# Patient Record
Sex: MALE | Race: WHITE | NOT HISPANIC OR LATINO | Employment: OTHER | URBAN - NONMETROPOLITAN AREA
[De-identification: names, ages, dates, MRNs, and addresses within clinical notes are randomized per-mention and may not be internally consistent; named-entity substitution may affect disease eponyms.]

---

## 2019-04-18 ENCOUNTER — HISTORICAL (OUTPATIENT)
Dept: ADMINISTRATIVE | Facility: HOSPITAL | Age: 79
End: 2019-04-18

## 2019-05-01 ENCOUNTER — HISTORICAL (OUTPATIENT)
Dept: ADMINISTRATIVE | Facility: HOSPITAL | Age: 79
End: 2019-05-01

## 2019-06-11 ENCOUNTER — HISTORICAL (OUTPATIENT)
Dept: ADMINISTRATIVE | Facility: HOSPITAL | Age: 79
End: 2019-06-11

## 2020-01-08 ENCOUNTER — OFFICE VISIT (OUTPATIENT)
Dept: UROLOGY | Facility: CLINIC | Age: 80
End: 2020-01-08
Payer: MEDICARE

## 2020-01-08 DIAGNOSIS — N13.8 BPH WITH URINARY OBSTRUCTION: Primary | ICD-10-CM

## 2020-01-08 DIAGNOSIS — R35.1 NOCTURIA: ICD-10-CM

## 2020-01-08 DIAGNOSIS — R39.15 URINARY URGENCY: ICD-10-CM

## 2020-01-08 DIAGNOSIS — R97.20 ELEVATED PSA: ICD-10-CM

## 2020-01-08 DIAGNOSIS — N40.1 BPH WITH URINARY OBSTRUCTION: Primary | ICD-10-CM

## 2020-01-08 DIAGNOSIS — R35.0 URINARY FREQUENCY: ICD-10-CM

## 2020-01-08 LAB — PSA, DIAGNOSTIC: 4.45 NG/ML (ref 0.1–4)

## 2020-01-08 PROCEDURE — 1159F MED LIST DOCD IN RCRD: CPT | Mod: S$GLB,,, | Performed by: UROLOGY

## 2020-01-08 PROCEDURE — 51798 PR MEAS,POST-VOID RES,US,NON-IMAGING: ICD-10-PCS | Mod: S$GLB,,, | Performed by: UROLOGY

## 2020-01-08 PROCEDURE — 1159F PR MEDICATION LIST DOCUMENTED IN MEDICAL RECORD: ICD-10-PCS | Mod: S$GLB,,, | Performed by: UROLOGY

## 2020-01-08 PROCEDURE — 99214 PR OFFICE/OUTPT VISIT, EST, LEVL IV, 30-39 MIN: ICD-10-PCS | Mod: 25,S$GLB,, | Performed by: UROLOGY

## 2020-01-08 PROCEDURE — 51798 US URINE CAPACITY MEASURE: CPT | Mod: S$GLB,,, | Performed by: UROLOGY

## 2020-01-08 PROCEDURE — 99214 OFFICE O/P EST MOD 30 MIN: CPT | Mod: 25,S$GLB,, | Performed by: UROLOGY

## 2020-01-08 RX ORDER — ALFUZOSIN HYDROCHLORIDE 10 MG/1
10 TABLET, EXTENDED RELEASE ORAL
Qty: 30 TABLET | Refills: 1 | Status: SHIPPED | OUTPATIENT
Start: 2020-01-08 | End: 2020-02-20

## 2020-01-08 NOTE — PROGRESS NOTES
Subjective:       Patient ID: Jair Pantoja is a 79 y.o. male.    Chief Complaint: No chief complaint on file.      HPI: 79-year-old male, patient of Dr. Bernal, presents for re-evaluation.  Patient last seen 01/23/2019.  Patient has a history of BPH, was on Flomax in the past.  However patient stop the Flomax because of side effects.  Patient states he has been having an increase in urinary frequency with episodes of urgency.  Also states he is having multiple episodes of nocturia at night.  Denies pain or burning with urination.  Denies odor to his urine.  Denies fever.  Patient has a history of an elevated PSA.  He had a biopsy on 07/27/2010, his PSA at that time was 3.89, biopsy was benign.  On 01/24/2019 he had a PSA of 4.13.  And on 11/20/2018 his PSA was 5.63, which was treated with Cipro.  Patient states he had a fall recently, was seen by a ear nose and throat doctor and is scheduled for a procedure in the near future.  No other urinary complaints.  All other health problems appear stable at this time.       Past Medical History:   Past Medical History:   Diagnosis Date    Diabetes     Heart disease     MI (myocardial infarction)     Stroke        Past Surgical Historical:   Past Surgical History:   Procedure Laterality Date    pace maker          Medications:      Past Social History:   Social History     Socioeconomic History    Marital status:      Spouse name: Not on file    Number of children: Not on file    Years of education: Not on file    Highest education level: Not on file   Occupational History    Not on file   Social Needs    Financial resource strain: Not on file    Food insecurity:     Worry: Not on file     Inability: Not on file    Transportation needs:     Medical: Not on file     Non-medical: Not on file   Tobacco Use    Smoking status: Never Smoker   Substance and Sexual Activity    Alcohol use: Not Currently    Drug use: Not on file    Sexual activity: Not on  file   Lifestyle    Physical activity:     Days per week: Not on file     Minutes per session: Not on file    Stress: Not on file   Relationships    Social connections:     Talks on phone: Not on file     Gets together: Not on file     Attends Sikhism service: Not on file     Active member of club or organization: Not on file     Attends meetings of clubs or organizations: Not on file     Relationship status: Not on file   Other Topics Concern    Not on file   Social History Narrative    Not on file       Allergies: Review of patient's allergies indicates:  No Known Allergies     Family History: History reviewed. No pertinent family history.     Review of Systems:  Review of Systems   Constitutional: Negative for activity change and appetite change.   HENT: Negative for congestion and dental problem.    Eyes: Negative for visual disturbance.   Respiratory: Negative for chest tightness and shortness of breath.    Cardiovascular: Negative for chest pain.   Gastrointestinal: Negative for abdominal distention and abdominal pain.   Genitourinary: Positive for frequency and urgency. Negative for decreased urine volume, difficulty urinating, discharge, dysuria, enuresis, flank pain, genital sores, hematuria, penile pain, penile swelling, scrotal swelling and testicular pain.   Musculoskeletal: Negative for back pain and neck pain.   Skin: Negative for color change.   Neurological: Negative for dizziness.   Hematological: Negative for adenopathy.   Psychiatric/Behavioral: Negative for agitation, behavioral problems and confusion.       Physical Exam:  Physical Exam   Nursing note and vitals reviewed.  Constitutional: He is oriented to person, place, and time. He appears well-developed and well-nourished.   HENT:   Head: Normocephalic.   Eyes: Pupils are equal, round, and reactive to light.   Neck: Normal range of motion. Neck supple.   Cardiovascular: Normal rate, regular rhythm and normal heart sounds.     Pulmonary/Chest: Effort normal and breath sounds normal.   Abdominal: Soft. Bowel sounds are normal.   Genitourinary:   Genitourinary Comments: Patient deferred a SINCERE.   Musculoskeletal: Normal range of motion.   Neurological: He is alert and oriented to person, place, and time.   Skin: Skin is warm and dry.     Psychiatric: He has a normal mood and affect. His behavior is normal.     Bladder scan:  70 cc    Assessment/Plan:   1.  BPH with obstruction/urinary frequency/urinary urgency/nocturia:  Will start the patient on some Uroxatral 10 mg.  Discussed timed voiding and double voiding.  Also discussed fluids in the evening.  2.  Elevated PSA:  Will check the patient's PSA.  We will notify him of any abnormal results.  Will plan follow-up in 4-6 weeks to evaluate the success of Uroxatral.  Sooner if needed.  Problem List Items Addressed This Visit     None      Visit Diagnoses     BPH with urinary obstruction    -  Primary    Relevant Medications    alfuzosin (UROXATRAL) 10 mg Tb24    Urinary frequency        Relevant Orders    POCT Bladder Scan    Urinary urgency        Relevant Orders    POCT Bladder Scan    Nocturia        Relevant Orders    POCT Bladder Scan    Elevated PSA        Relevant Orders    Prostate Specific Antigen, Diagnostic

## 2020-01-09 ENCOUNTER — DOCUMENTATION ONLY (OUTPATIENT)
Dept: UROLOGY | Facility: CLINIC | Age: 80
End: 2020-01-09

## 2020-01-09 NOTE — PROGRESS NOTES
PSA 4.45 on 01/08/2020  Slight increase from 4.13 on 01/24/19.  Pt expressed no desire to have repeat biopsy during visit.  Will recheck psa at his 4-6 week follow up.

## 2020-01-13 LAB — POC RESIDUAL URINE VOLUME: 70 ML (ref 0–100)

## 2020-01-14 ENCOUNTER — TELEPHONE (OUTPATIENT)
Dept: UROLOGY | Facility: CLINIC | Age: 80
End: 2020-01-14

## 2020-01-23 ENCOUNTER — TELEPHONE (OUTPATIENT)
Dept: UROLOGY | Facility: CLINIC | Age: 80
End: 2020-01-23

## 2020-01-23 NOTE — TELEPHONE ENCOUNTER
Results given to pt, verbalized understanding.  ----- Message from Stacey Johnson sent at 1/23/2020 11:47 AM CST -----  Contact: wife   Type:  Patient Returning Call    Who Called Wife   Who Left Message for Patient:brendan   Does the patient know what this is regarding?:na  Would the patient rather a call back or a response via [facility]? Callback   Best Call Back Number:[phone]   Additional Information:

## 2020-02-19 DIAGNOSIS — N13.8 BPH WITH URINARY OBSTRUCTION: ICD-10-CM

## 2020-02-19 DIAGNOSIS — N40.1 BPH WITH URINARY OBSTRUCTION: ICD-10-CM

## 2020-02-20 RX ORDER — ALFUZOSIN HYDROCHLORIDE 10 MG/1
10 TABLET, EXTENDED RELEASE ORAL DAILY
Qty: 30 TABLET | Refills: 0 | Status: SHIPPED | OUTPATIENT
Start: 2020-02-20 | End: 2020-03-23

## 2020-03-21 DIAGNOSIS — N40.1 BPH WITH URINARY OBSTRUCTION: ICD-10-CM

## 2020-03-21 DIAGNOSIS — N13.8 BPH WITH URINARY OBSTRUCTION: ICD-10-CM

## 2020-03-23 RX ORDER — ALFUZOSIN HYDROCHLORIDE 10 MG/1
TABLET, EXTENDED RELEASE ORAL
Qty: 30 TABLET | Refills: 0 | Status: SHIPPED | OUTPATIENT
Start: 2020-03-23 | End: 2020-04-20

## 2020-04-20 DIAGNOSIS — N40.1 BPH WITH URINARY OBSTRUCTION: ICD-10-CM

## 2020-04-20 DIAGNOSIS — N13.8 BPH WITH URINARY OBSTRUCTION: ICD-10-CM

## 2020-04-20 RX ORDER — ALFUZOSIN HYDROCHLORIDE 10 MG/1
TABLET, EXTENDED RELEASE ORAL
Qty: 30 TABLET | Refills: 0 | Status: SHIPPED | OUTPATIENT
Start: 2020-04-20

## 2022-03-23 ENCOUNTER — HISTORICAL (OUTPATIENT)
Dept: ADMINISTRATIVE | Facility: HOSPITAL | Age: 82
End: 2022-03-23

## 2022-03-23 LAB
ABS NEUT (OLG): 3.84 (ref 2.1–9.2)
BASOPHILS # BLD AUTO: 0.1 10*3/UL (ref 0–0.2)
BASOPHILS NFR BLD AUTO: 1 %
BUN SERPL-MCNC: 11.4 MG/DL (ref 8.4–25.7)
CALCIUM SERPL-MCNC: 9.5 MG/DL (ref 8.7–10.5)
CHLORIDE SERPL-SCNC: 103 MMOL/L (ref 98–107)
CO2 SERPL-SCNC: 30 MMOL/L (ref 23–31)
CREAT SERPL-MCNC: 0.73 MG/DL (ref 0.73–1.18)
CREAT/UREA NIT SERPL: 16
EOSINOPHIL # BLD AUTO: 0.7 10*3/UL (ref 0–0.9)
EOSINOPHIL NFR BLD AUTO: 11 %
ERYTHROCYTE [DISTWIDTH] IN BLOOD BY AUTOMATED COUNT: 13.2 % (ref 11.5–17)
GLUCOSE SERPL-MCNC: 83 MG/DL (ref 82–115)
HCT VFR BLD AUTO: 40.2 % (ref 42–52)
HEMOLYSIS INTERF INDEX SERPL-ACNC: 3
HGB BLD-MCNC: 12.7 G/DL (ref 14–18)
ICTERIC INTERF INDEX SERPL-ACNC: 1
LIPEMIC INTERF INDEX SERPL-ACNC: 2
LYMPHOCYTES # BLD AUTO: 1.6 10*3/UL (ref 0.6–4.6)
LYMPHOCYTES NFR BLD AUTO: 24 %
MANUAL DIFF? (OHS): NO
MCH RBC QN AUTO: 29.3 PG (ref 27–31)
MCHC RBC AUTO-ENTMCNC: 31.6 G/DL (ref 33–36)
MCV RBC AUTO: 92.8 FL (ref 80–94)
MONOCYTES # BLD AUTO: 0.4 10*3/UL (ref 0.1–1.3)
MONOCYTES NFR BLD AUTO: 6 %
NEUTROPHILS # BLD AUTO: 3.84 10*3/UL (ref 2.1–9.2)
NEUTROPHILS NFR BLD AUTO: 58 %
PLATELET # BLD AUTO: 263 10*3/UL (ref 130–400)
PMV BLD AUTO: 9.9 FL (ref 9.4–12.4)
POTASSIUM SERPL-SCNC: 4.1 MMOL/L (ref 3.5–5.1)
RBC # BLD AUTO: 4.33 10*6/UL (ref 4.7–6.1)
SODIUM SERPL-SCNC: 142 MMOL/L (ref 136–145)
VERIFY DIFF SUSPECT FLAG (OHS): NORMAL
WBC # SPEC AUTO: 6.7 10*3/UL (ref 4.5–11.5)

## 2023-10-30 ENCOUNTER — HOSPITAL ENCOUNTER (OUTPATIENT)
Facility: HOSPITAL | Age: 83
Discharge: HOME HEALTH | End: 2023-10-31
Attending: FAMILY MEDICINE | Admitting: INTERNAL MEDICINE
Payer: MEDICARE

## 2023-10-30 DIAGNOSIS — R07.9 CHEST PAIN: ICD-10-CM

## 2023-10-30 DIAGNOSIS — E86.0 DEHYDRATION: Primary | ICD-10-CM

## 2023-10-30 DIAGNOSIS — R53.1 WEAKNESS: ICD-10-CM

## 2023-10-30 DIAGNOSIS — R05.9 COUGH IN ADULT: ICD-10-CM

## 2023-10-30 PROBLEM — Z86.73 HISTORY OF CVA (CEREBROVASCULAR ACCIDENT): Status: ACTIVE | Noted: 2023-10-30

## 2023-10-30 PROBLEM — I63.9 CVA (CEREBRAL VASCULAR ACCIDENT): Status: ACTIVE | Noted: 2023-10-30

## 2023-10-30 PROBLEM — I63.9 CVA (CEREBRAL VASCULAR ACCIDENT): Status: RESOLVED | Noted: 2023-10-30 | Resolved: 2023-10-30

## 2023-10-30 PROBLEM — I48.0 PAROXYSMAL ATRIAL FIBRILLATION: Status: ACTIVE | Noted: 2023-10-30

## 2023-10-30 LAB
ALBUMIN SERPL-MCNC: 4.2 G/DL (ref 3.4–5)
ALBUMIN/GLOB SERPL: 1.7 RATIO
ALP SERPL-CCNC: 54 UNIT/L (ref 50–144)
ALT SERPL-CCNC: 22 UNIT/L (ref 1–45)
ANION GAP SERPL CALC-SCNC: 6 MEQ/L (ref 2–13)
APPEARANCE UR: ABNORMAL
AST SERPL-CCNC: 46 UNIT/L (ref 17–59)
BASOPHILS # BLD AUTO: 0.05 X10(3)/MCL (ref 0.01–0.08)
BASOPHILS NFR BLD AUTO: 0.7 % (ref 0.1–1.2)
BILIRUB SERPL-MCNC: 1.1 MG/DL (ref 0–1)
BILIRUB UR QL STRIP.AUTO: NEGATIVE
BUN SERPL-MCNC: 22 MG/DL (ref 7–20)
CALCIUM SERPL-MCNC: 8.9 MG/DL (ref 8.4–10.2)
CHLORIDE SERPL-SCNC: 101 MMOL/L (ref 98–110)
CK SERPL-CCNC: 52 U/L (ref 55–170)
CO2 SERPL-SCNC: 29 MMOL/L (ref 21–32)
COLOR UR AUTO: YELLOW
CREAT SERPL-MCNC: 0.67 MG/DL (ref 0.66–1.25)
CREAT/UREA NIT SERPL: 33 (ref 12–20)
EOSINOPHIL # BLD AUTO: 0.03 X10(3)/MCL (ref 0.04–0.54)
EOSINOPHIL NFR BLD AUTO: 0.4 % (ref 0.7–7)
ERYTHROCYTE [DISTWIDTH] IN BLOOD BY AUTOMATED COUNT: 14.3 %
GFR SERPLBLD CREATININE-BSD FMLA CKD-EPI: >90 MLS/MIN/1.73/M2
GLOBULIN SER-MCNC: 2.5 GM/DL (ref 2–3.9)
GLUCOSE SERPL-MCNC: 79 MG/DL (ref 70–115)
GLUCOSE UR QL STRIP.AUTO: NEGATIVE
HCT VFR BLD AUTO: 41.9 % (ref 36–52)
HGB BLD-MCNC: 14.3 G/DL (ref 13–18)
IMM GRANULOCYTES # BLD AUTO: 0.02 X10(3)/MCL (ref 0–0.03)
IMM GRANULOCYTES NFR BLD AUTO: 0.3 % (ref 0–0.5)
KETONES UR QL STRIP.AUTO: 15
LEUKOCYTE ESTERASE UR QL STRIP.AUTO: NEGATIVE
LYMPHOCYTES # BLD AUTO: 0.87 X10(3)/MCL (ref 1.32–3.57)
LYMPHOCYTES NFR BLD AUTO: 12.6 % (ref 20–55)
MAGNESIUM SERPL-MCNC: 2.4 MG/DL (ref 1.8–2.4)
MCH RBC QN AUTO: 31.9 PG (ref 27–34)
MCHC RBC AUTO-ENTMCNC: 34.1 G/DL (ref 31–37)
MCV RBC AUTO: 93.5 FL (ref 79–99)
MONOCYTES # BLD AUTO: 0.5 X10(3)/MCL (ref 0.3–0.82)
MONOCYTES NFR BLD AUTO: 7.2 % (ref 4.7–12.5)
NEUTROPHILS # BLD AUTO: 5.45 X10(3)/MCL (ref 1.78–5.38)
NEUTROPHILS NFR BLD AUTO: 78.8 % (ref 37–73)
NITRITE UR QL STRIP.AUTO: NEGATIVE
NRBC BLD AUTO-RTO: 0 %
PH UR STRIP.AUTO: 7 [PH]
PLATELET # BLD AUTO: 172 X10(3)/MCL (ref 140–371)
PMV BLD AUTO: 9.6 FL (ref 9.4–12.4)
POTASSIUM SERPL-SCNC: 4 MMOL/L (ref 3.5–5.1)
PROT SERPL-MCNC: 6.7 GM/DL (ref 6.3–8.2)
PROT UR QL STRIP.AUTO: 30
RBC # BLD AUTO: 4.48 X10(6)/MCL (ref 4–6)
RBC UR QL AUTO: NEGATIVE
SODIUM SERPL-SCNC: 136 MMOL/L (ref 135–145)
SP GR UR STRIP.AUTO: 1.02 (ref 1–1.03)
UROBILINOGEN UR STRIP-ACNC: 0.2
WBC # SPEC AUTO: 6.92 X10(3)/MCL (ref 4–11.5)

## 2023-10-30 PROCEDURE — G0378 HOSPITAL OBSERVATION PER HR: HCPCS

## 2023-10-30 PROCEDURE — 85025 COMPLETE CBC W/AUTO DIFF WBC: CPT | Performed by: FAMILY MEDICINE

## 2023-10-30 PROCEDURE — 96374 THER/PROPH/DIAG INJ IV PUSH: CPT

## 2023-10-30 PROCEDURE — 96361 HYDRATE IV INFUSION ADD-ON: CPT

## 2023-10-30 PROCEDURE — 63600175 PHARM REV CODE 636 W HCPCS: Performed by: INTERNAL MEDICINE

## 2023-10-30 PROCEDURE — 81003 URINALYSIS AUTO W/O SCOPE: CPT | Performed by: FAMILY MEDICINE

## 2023-10-30 PROCEDURE — 99900035 HC TECH TIME PER 15 MIN (STAT)

## 2023-10-30 PROCEDURE — 93010 ELECTROCARDIOGRAM REPORT: CPT | Mod: ,,, | Performed by: INTERNAL MEDICINE

## 2023-10-30 PROCEDURE — 25000003 PHARM REV CODE 250: Performed by: FAMILY MEDICINE

## 2023-10-30 PROCEDURE — 82550 ASSAY OF CK (CPK): CPT | Performed by: FAMILY MEDICINE

## 2023-10-30 PROCEDURE — 93010 EKG 12-LEAD: ICD-10-PCS | Mod: ,,, | Performed by: INTERNAL MEDICINE

## 2023-10-30 PROCEDURE — 93005 ELECTROCARDIOGRAM TRACING: CPT

## 2023-10-30 PROCEDURE — 80162 ASSAY OF DIGOXIN TOTAL: CPT | Performed by: FAMILY MEDICINE

## 2023-10-30 PROCEDURE — 80053 COMPREHEN METABOLIC PANEL: CPT | Performed by: FAMILY MEDICINE

## 2023-10-30 PROCEDURE — 99285 EMERGENCY DEPT VISIT HI MDM: CPT | Mod: 25

## 2023-10-30 PROCEDURE — 25000003 PHARM REV CODE 250: Performed by: INTERNAL MEDICINE

## 2023-10-30 PROCEDURE — 96372 THER/PROPH/DIAG INJ SC/IM: CPT | Performed by: INTERNAL MEDICINE

## 2023-10-30 PROCEDURE — 83735 ASSAY OF MAGNESIUM: CPT | Performed by: FAMILY MEDICINE

## 2023-10-30 RX ORDER — SODIUM CHLORIDE 9 MG/ML
1000 INJECTION, SOLUTION INTRAVENOUS
Status: COMPLETED | OUTPATIENT
Start: 2023-10-30 | End: 2023-10-30

## 2023-10-30 RX ORDER — APIXABAN 5 MG/1
5 TABLET, FILM COATED ORAL 2 TIMES DAILY
COMMUNITY
Start: 2023-10-30

## 2023-10-30 RX ORDER — NALOXONE HCL 0.4 MG/ML
0.02 VIAL (ML) INJECTION
Status: DISCONTINUED | OUTPATIENT
Start: 2023-10-30 | End: 2023-10-31 | Stop reason: HOSPADM

## 2023-10-30 RX ORDER — IBUPROFEN 200 MG
24 TABLET ORAL
Status: DISCONTINUED | OUTPATIENT
Start: 2023-10-30 | End: 2023-10-31 | Stop reason: HOSPADM

## 2023-10-30 RX ORDER — ENOXAPARIN SODIUM 100 MG/ML
40 INJECTION SUBCUTANEOUS EVERY 24 HOURS
Status: DISCONTINUED | OUTPATIENT
Start: 2023-10-30 | End: 2023-10-31

## 2023-10-30 RX ORDER — ONDANSETRON 2 MG/ML
4 INJECTION INTRAMUSCULAR; INTRAVENOUS EVERY 8 HOURS PRN
Status: DISCONTINUED | OUTPATIENT
Start: 2023-10-30 | End: 2023-10-31 | Stop reason: HOSPADM

## 2023-10-30 RX ORDER — GLUCAGON 1 MG
1 KIT INJECTION
Status: DISCONTINUED | OUTPATIENT
Start: 2023-10-30 | End: 2023-10-31 | Stop reason: HOSPADM

## 2023-10-30 RX ORDER — IPRATROPIUM BROMIDE AND ALBUTEROL SULFATE 2.5; .5 MG/3ML; MG/3ML
3 SOLUTION RESPIRATORY (INHALATION) EVERY 6 HOURS PRN
Status: DISCONTINUED | OUTPATIENT
Start: 2023-10-30 | End: 2023-10-31 | Stop reason: HOSPADM

## 2023-10-30 RX ORDER — SODIUM CHLORIDE 0.9 % (FLUSH) 0.9 %
10 SYRINGE (ML) INJECTION EVERY 12 HOURS PRN
Status: DISCONTINUED | OUTPATIENT
Start: 2023-10-30 | End: 2023-10-31 | Stop reason: HOSPADM

## 2023-10-30 RX ORDER — IPRATROPIUM BROMIDE AND ALBUTEROL SULFATE 2.5; .5 MG/3ML; MG/3ML
SOLUTION RESPIRATORY (INHALATION) EVERY 8 HOURS
COMMUNITY
Start: 2023-10-18

## 2023-10-30 RX ORDER — HYDRALAZINE HYDROCHLORIDE 20 MG/ML
10 INJECTION INTRAMUSCULAR; INTRAVENOUS EVERY 6 HOURS PRN
Status: DISCONTINUED | OUTPATIENT
Start: 2023-10-30 | End: 2023-10-31 | Stop reason: HOSPADM

## 2023-10-30 RX ORDER — IBUPROFEN 200 MG
16 TABLET ORAL
Status: DISCONTINUED | OUTPATIENT
Start: 2023-10-30 | End: 2023-10-31 | Stop reason: HOSPADM

## 2023-10-30 RX ORDER — SODIUM CHLORIDE 9 MG/ML
INJECTION, SOLUTION INTRAVENOUS CONTINUOUS
Status: ACTIVE | OUTPATIENT
Start: 2023-10-30 | End: 2023-10-31

## 2023-10-30 RX ORDER — DIGOXIN 0.25 MG/ML
500 INJECTION INTRAMUSCULAR; INTRAVENOUS
Status: DISCONTINUED | OUTPATIENT
Start: 2023-10-30 | End: 2023-10-30

## 2023-10-30 RX ORDER — DIGOXIN 125 MCG
0.12 TABLET ORAL
COMMUNITY
Start: 2023-10-30

## 2023-10-30 RX ADMIN — SODIUM CHLORIDE: 9 INJECTION, SOLUTION INTRAVENOUS at 08:10

## 2023-10-30 RX ADMIN — ENOXAPARIN SODIUM 40 MG: 40 INJECTION SUBCUTANEOUS at 08:10

## 2023-10-30 RX ADMIN — SODIUM CHLORIDE 1000 ML: 9 INJECTION, SOLUTION INTRAVENOUS at 05:10

## 2023-10-30 RX ADMIN — HYDRALAZINE HYDROCHLORIDE 10 MG: 20 INJECTION INTRAMUSCULAR; INTRAVENOUS at 08:10

## 2023-10-30 NOTE — ED PROVIDER NOTES
"Encounter Date: 10/30/2023       History     Chief Complaint   Patient presents with    Weakness     Pt reports increase in weakness since yesterday and High BP readings. Pt denies any chest pain. Pt states "I just feel week." Pt denies N/V. States having diarrhea x couple of days ago. Pt daughter reports pt usually up and around independently, w/ occasional ambulation w/ cane. Pt does have significant cardiac hx and is on digoxin. Dr. Marte Cardiologist and Nathan PCP    Hypertension     Patient presents with generalized weakness since last night.  He reports no fevers chills sweats nausea vomiting.  Denies chest or abdominal pain.  It does not endorse a cough or shortness a breath.  Normally ambulatory at home but now too weak to perform his ADLs.        Review of patient's allergies indicates:   Allergen Reactions    Flomax [tamsulosin] Other (See Comments)    Opioids - morphine analogues Hallucinations     Past Medical History:   Diagnosis Date    Diabetes     Heart disease     MI (myocardial infarction)     Stroke      Past Surgical History:   Procedure Laterality Date    APPENDECTOMY      CARDIAC SURGERY      CHOLECYSTECTOMY      pace maker      PROSTATE SURGERY       History reviewed. No pertinent family history.  Social History     Tobacco Use    Smoking status: Never   Substance Use Topics    Alcohol use: Not Currently     Review of Systems   Constitutional:  Positive for fatigue.   HENT: Negative.     Eyes: Negative.    Respiratory: Negative.     Cardiovascular: Negative.    Neurological:  Positive for weakness.       Physical Exam     Initial Vitals [10/30/23 1542]   BP Pulse Resp Temp SpO2   125/82 74 17 97.7 °F (36.5 °C) 97 %      MAP       --         Physical Exam    Constitutional: He appears well-developed and well-nourished.   Listless   HENT:   Head: Normocephalic and atraumatic.   Eyes: EOM are normal. Pupils are equal, round, and reactive to light.   Cardiovascular:  Normal rate.         "   Pulmonary/Chest: Breath sounds normal.   Abdominal: Abdomen is soft. Bowel sounds are normal.     Neurological: He is alert and oriented to person, place, and time. A cranial nerve deficit and sensory deficit is present.   Skin: Skin is warm and dry.         ED Course   Procedures  Labs Reviewed   COMPREHENSIVE METABOLIC PANEL - Abnormal; Notable for the following components:       Result Value    Blood Urea Nitrogen 22.0 (*)     Bilirubin Total 1.1 (*)     BUN/Creatinine Ratio 33 (*)     All other components within normal limits   URINALYSIS - Abnormal; Notable for the following components:    Appearance, UA SL CLOUDY (*)     Protein, UA 30 (*)     Ketones, UA 15 (*)     All other components within normal limits    Narrative:      URINE STABILITY IS 2 HOURS AT ROOM TEMP OR    SIX HOURS REFRIGERATED. PERFORMING TESTING ON    SPECIMENS GREATER THAN THIS AGE MAY AFFECT THE    FOLLOWING TESTS:    PH          SPECIFIC GRAVITY           BLOOD    CLARITY     BILIRUBIN               UROBILINOGEN   CK - Abnormal; Notable for the following components:    Creatine Kinase 52 (*)     All other components within normal limits   CBC WITH DIFFERENTIAL - Abnormal; Notable for the following components:    Neut % 78.8 (*)     Lymph % 12.6 (*)     Eos % 0.4 (*)     Lymph # 0.87 (*)     Neut # 5.45 (*)     Eos # 0.03 (*)     All other components within normal limits   MAGNESIUM - Normal   CBC W/ AUTO DIFFERENTIAL    Narrative:     The following orders were created for panel order CBC auto differential.  Procedure                               Abnormality         Status                     ---------                               -----------         ------                     CBC with Differential[[id number]]       Abnormal            Final result                 Please view results for these tests on the individual orders.   DIGOXIN LEVEL     EKG Readings: (Independently Interpreted)   Initial Reading: No STEMI. Rhythm: Atrial  Fibrillation. Heart Rate: 72. ST Segments: Normal ST Segments. T Waves Flipped: V5, V6 and III.       Imaging Results              X-Ray Chest AP Portable (In process)                      CT Head Without Contrast (Final result)  Result time 10/30/23 18:14:46      Final result by Yoan Price MD (10/30/23 18:14:46)                   Impression:    Impression:  1. No acute intracranial process is identified.  2. Scattered white matter microvascular ischemic changes.  3. Diffuse involutional changes are proportionate to the patient's age.    4.  Chronic lacunar infarcts versus prominent perivascular spaces in the bilateral deep gray and white matter structures, as above.      Electronically signed by: Yoan Price MD  Date:    10/30/2023  Time:    18:14               Narrative:      CT HEAD WITHOUT CONTRAST:    CPT 02426    Total DLP: 1211.90 mGy-cm  Automatic exposure control was utilized to limit the radiation dose to the patient.    History: Worsening weakness and loss of function and inability to perform activities of daily living.      Comparison: MRI 06/02/2015      Technique: Multiple contiguous axial images were acquired from the base of the skull the vertex without contrast administration.    Findings:  There are diffuse cerebral and cerebellar parenchymal involutional changes with resultant prominence of the ventricles and basal cisterns.  There is a chronic lacunar infarct versus prominent perivascular spaces in both external capsules, lenticular nuclei, and thalami there is extensive asymmetric low density without obvious mass-effect throughout the bilateral supratentorial white matter. The gray-white junctions are maintained. No other cerebral or cerebellar parenchymal abnormality is identified. There is no hemorrhage, midline shift, significant mass-effect, or extra-axial fluid collection. There are calcifications of the distal internal carotid and vertebrobasilar arteries. The partially  visualized paranasal sinuses and mastoid air cells are clear. The calvarium is intact.                                       Medications   0.9%  NaCl infusion (1,000 mLs Intravenous New Bag 10/30/23 5378)     Medical Decision Making  [place] of care note:    Pt is a 83 y.o. male complaining of weakness. Their evaluation was initiated by previous ER physician and turned over to my care at shift change. I have examined the patient and agree with previous documentation.    The history is consistent with a out of diarrhea and some resultant dehydration.  Diarrhea has resolved and he has no abdominal pain, but feels very weak.  He is usually able to handle his ADLs, although his daughter lives with him.  Family has had trouble getting him OOB and moving around the house.  CT brain no acute, cxr clear, ketones in urine, elevated BUN/Cr 22/0.8, baseline 8/0.6.  Will place on gentle ivf and admit to obs.    Wander Jacobs MD  6:21 PM 10/30/2023     Amount and/or Complexity of Data Reviewed  Labs: ordered.  Radiology: ordered.    Risk  Prescription drug management.                               Clinical Impression:   Final diagnoses:  [R53.1] Weakness  [R05.9] Cough in adult  [E86.0] Dehydration (Primary)        ED Disposition Condition    Admit Stable                Wander Jacobs MD  10/30/23 7695

## 2023-10-31 VITALS
BODY MASS INDEX: 19.33 KG/M2 | WEIGHT: 130.5 LBS | HEIGHT: 69 IN | OXYGEN SATURATION: 97 % | TEMPERATURE: 98 F | RESPIRATION RATE: 16 BRPM | HEART RATE: 62 BPM | SYSTOLIC BLOOD PRESSURE: 144 MMHG | DIASTOLIC BLOOD PRESSURE: 93 MMHG

## 2023-10-31 LAB
ANION GAP SERPL CALC-SCNC: 4 MEQ/L (ref 2–13)
BASOPHILS # BLD AUTO: 0.04 X10(3)/MCL (ref 0.01–0.08)
BASOPHILS NFR BLD AUTO: 0.9 % (ref 0.1–1.2)
BUN SERPL-MCNC: 22 MG/DL (ref 7–20)
CALCIUM SERPL-MCNC: 8.5 MG/DL (ref 8.4–10.2)
CHLORIDE SERPL-SCNC: 103 MMOL/L (ref 98–110)
CO2 SERPL-SCNC: 28 MMOL/L (ref 21–32)
CREAT SERPL-MCNC: 0.57 MG/DL (ref 0.66–1.25)
CREAT/UREA NIT SERPL: 39 (ref 12–20)
DIGOXIN SERPL-MCNC: <0.2 NG/ML (ref 0.8–2)
EOSINOPHIL # BLD AUTO: 0.03 X10(3)/MCL (ref 0.04–0.54)
EOSINOPHIL NFR BLD AUTO: 0.6 % (ref 0.7–7)
ERYTHROCYTE [DISTWIDTH] IN BLOOD BY AUTOMATED COUNT: 14.2 %
GFR SERPLBLD CREATININE-BSD FMLA CKD-EPI: >90 MLS/MIN/1.73/M2
GLUCOSE SERPL-MCNC: 71 MG/DL (ref 70–115)
HCT VFR BLD AUTO: 36.6 % (ref 36–52)
HGB BLD-MCNC: 12.5 G/DL (ref 13–18)
IMM GRANULOCYTES # BLD AUTO: 0.01 X10(3)/MCL (ref 0–0.03)
IMM GRANULOCYTES NFR BLD AUTO: 0.2 % (ref 0–0.5)
LYMPHOCYTES # BLD AUTO: 1.11 X10(3)/MCL (ref 1.32–3.57)
LYMPHOCYTES NFR BLD AUTO: 23.9 % (ref 20–55)
MCH RBC QN AUTO: 31.3 PG (ref 27–34)
MCHC RBC AUTO-ENTMCNC: 34.2 G/DL (ref 31–37)
MCV RBC AUTO: 91.5 FL (ref 79–99)
MONOCYTES # BLD AUTO: 0.44 X10(3)/MCL (ref 0.3–0.82)
MONOCYTES NFR BLD AUTO: 9.5 % (ref 4.7–12.5)
NEUTROPHILS # BLD AUTO: 3.02 X10(3)/MCL (ref 1.78–5.38)
NEUTROPHILS NFR BLD AUTO: 64.9 % (ref 37–73)
NRBC BLD AUTO-RTO: 0 %
PLATELET # BLD AUTO: 158 X10(3)/MCL (ref 140–371)
PMV BLD AUTO: 9.5 FL (ref 9.4–12.4)
POTASSIUM SERPL-SCNC: 3.7 MMOL/L (ref 3.5–5.1)
RBC # BLD AUTO: 4 X10(6)/MCL (ref 4–6)
SODIUM SERPL-SCNC: 135 MMOL/L (ref 135–145)
WBC # SPEC AUTO: 4.65 X10(3)/MCL (ref 4–11.5)

## 2023-10-31 PROCEDURE — 97161 PT EVAL LOW COMPLEX 20 MIN: CPT

## 2023-10-31 PROCEDURE — G0378 HOSPITAL OBSERVATION PER HR: HCPCS

## 2023-10-31 PROCEDURE — 80048 BASIC METABOLIC PNL TOTAL CA: CPT | Performed by: INTERNAL MEDICINE

## 2023-10-31 PROCEDURE — 25000003 PHARM REV CODE 250: Performed by: INTERNAL MEDICINE

## 2023-10-31 PROCEDURE — 36415 COLL VENOUS BLD VENIPUNCTURE: CPT | Performed by: INTERNAL MEDICINE

## 2023-10-31 PROCEDURE — 85025 COMPLETE CBC W/AUTO DIFF WBC: CPT | Performed by: INTERNAL MEDICINE

## 2023-10-31 PROCEDURE — 99900035 HC TECH TIME PER 15 MIN (STAT)

## 2023-10-31 PROCEDURE — 94761 N-INVAS EAR/PLS OXIMETRY MLT: CPT

## 2023-10-31 RX ORDER — DIGOXIN 125 MCG
0.12 TABLET ORAL DAILY
Status: DISCONTINUED | OUTPATIENT
Start: 2023-10-31 | End: 2023-10-31 | Stop reason: HOSPADM

## 2023-10-31 RX ADMIN — APIXABAN 5 MG: 5 TABLET, FILM COATED ORAL at 08:10

## 2023-10-31 RX ADMIN — DIGOXIN 0.12 MG: 125 TABLET ORAL at 08:10

## 2023-10-31 NOTE — PLAN OF CARE
Problem: Physical Therapy  Goal: Physical Therapy Goal  Description: Goals to be met by: discharge     Patient will increase functional independence with mobility by performin. Supine to sit with Modified [place]  2. Sit to supine with Modified [place]  3. Sit to stand transfer with Supervision and Stand-by Assistance  4. Gait  x 100 feet with Supervision and Stand-by Assistance using Rolling Walker.     10/31/2023 1106 by Jenna Mcgraw, PT  Outcome: Adequate for Care Transition  10/31/2023 1105 by Jenna Mcgraw, PT  Outcome: Adequate for Care Transition  10/31/2023 1046 by Jenna Mcgraw, PT  Outcome: Ongoing, Progressing

## 2023-10-31 NOTE — PLAN OF CARE
Physician ordered to discharge patient home with home health care  services. Referral made to [facility] as patient has used their services in the past  [facility] nurse to admit patient tomorrow.

## 2023-10-31 NOTE — PLAN OF CARE
10/31/23 1130   Final Note   Assessment Type Final Discharge Note   Anticipated Discharge Disposition Home-Health  ([facility])   What phone number can be called within the next 1-3 days to see how you are doing after discharge? [phone]   Hospital Resources/Appts/Education Provided Post-Acute resouces added to AVS   Post-Acute Status   Post-Acute Authorization Home Health   Home Health Status Set-up Complete/Auth obtained   Coverage MCR   Patient choice form signed by patient/caregiver List with quality metrics by geographic area provided;List from CMS Compare;List from System Post-Acute Care   Discharge Delays None known at this time

## 2023-10-31 NOTE — PT/OT/SLP DISCHARGE
Physical Therapy Discharge Summary    Name: Jair Pantoja  MRN: [id number]   Principal Problem: Generalized weakness     Patient Discharged from acute Physical Therapy on 10/31/23.  Please refer to prior PT noted date on 10/31/23 for functional status.     Assessment:     Goals partially met. Patient appropriate for care in another setting.    Objective:     GOALS:   Multidisciplinary Problems       Physical Therapy Goals          Problem: Physical Therapy    Goal Priority Disciplines Outcome Goal Variances Interventions   Physical Therapy Goal     PT, PT/OT Adequate for Care Transition     Description: Goals to be met by: discharge     Patient will increase functional independence with mobility by performin. Supine to sit with Modified [place]  2. Sit to supine with Modified [place]  3. Sit to stand transfer with Supervision and Stand-by Assistance  4. Gait  x 100 feet with Supervision and Stand-by Assistance using Rolling Walker.                          Reasons for Discontinuation of Therapy Services  Transfer to alternate level of care.      Plan:     Patient Discharged to: Home with Home Health Service.      10/31/2023

## 2023-10-31 NOTE — PLAN OF CARE
10/31/23 0931   Discharge Assessment   Assessment Type Discharge Planning Assessment   Confirmed/corrected address, phone number and insurance Yes   Confirmed Demographics Correct on Facesheet   Source of Information patient   When was your last doctors appointment? 10/24/23   Reason For Admission Dehydration, Weakness   People in Home child(rosetta), adult   Do you expect to return to your current living situation? Yes   Do you have help at home or someone to help you manage your care at home? Yes   Who are your caregiver(s) and their phone number(s)? Alia Pantoja-Daughter  [phone]   Prior to hospitilization cognitive status: Alert/Oriented   Current cognitive status: Alert/Oriented   Walking or Climbing Stairs ambulation difficulty, requires equipment   Mobility Management Cane at times   Dressing/Bathing bathing difficulty, assistance 1 person   Dressing/Bathing Management Daughter   Equipment Currently Used at Home cane, straight;nebulizer   Readmission within 30 days? No   Patient currently being followed by outpatient case management? No   Do you currently have service(s) that help you manage your care at home? No   Do you take prescription medications? Yes   Do you have prescription coverage? Yes   Coverage MCR   Do you have any problems affording any of your prescribed medications? No   Is the patient taking medications as prescribed? yes   Who is going to help you get home at discharge? Daughter   How do you get to doctors appointments? family or friend will provide   Are you on dialysis? No   Do you take coumadin? No   DME Needed Upon Discharge  none   Discharge Plan discussed with: Patient   Transition of Care Barriers None   Discharge Plan A Home Health   Discharge Plan B Rehab   Physical Activity   On average, how many days per week do you engage in moderate to strenuous exercise (like a brisk walk)? 7 days   On average, how many minutes do you engage in exercise at this level? 20 min   Financial  Resource Strain   How hard is it for you to pay for the very basics like food, housing, medical care, and heating? Not hard   Housing Stability   In the last 12 months, was there a time when you were not able to pay the mortgage or rent on time? N   In the last 12 months, how many places have you lived? 1   In the last 12 months, was there a time when you did not have a steady place to sleep or slept in a shelter (including now)? N   Transportation Needs   In the past 12 months, has lack of transportation kept you from medical appointments or from getting medications? no   In the past 12 months, has lack of transportation kept you from meetings, work, or from getting things needed for daily living? No   Food Insecurity   Within the past 12 months, you worried that your food would run out before you got the money to buy more. Never true   Within the past 12 months, the food you bought just didn't last and you didn't have money to get more. Never true   Stress   Do you feel stress - tense, restless, nervous, or anxious, or unable to sleep at night because your mind is troubled all the time - these days? Only a littl   Social Connections   In a typical week, how many times do you talk on the phone with family, friends, or neighbors? Three   How often do you get together with friends or relatives? More than 3   How often do you attend Adventism or Zoroastrianism services? More than 4   Do you belong to any clubs or organizations such as Adventism groups, unions, fraternal or athletic groups, or school groups? No   How often do you attend meetings of the clubs or organizations you belong to? Never   Are you , , , , never , or living with a partner?    Alcohol Use   Q1: How often do you have a drink containing alcohol? Never   Q2: How many drinks containing alcohol do you have on a typical day when you are drinking? None   Q3: How often do you have six or more drinks on one occasion?  Never   OTHER   Name(s) of People in Home Alia Pantoja-Daughter     I discussed the discharge plan with the daughter-Alia Pantoja.  She states that the patient would not go to an inpatient rehab but would be agreeable to Home Health Care with PT at discharge.  The patient has used [facility] in the past but the daughter cannot remember the name of the company.  She states that they have no preference of HHC/PT companies.

## 2023-10-31 NOTE — PT/OT/SLP EVAL
Personalized Preventive Plan for Anitha Marshall - 12/21/2021  Medicare offers a range of preventive health benefits. Some of the tests and screenings are paid in full while other may be subject to a deductible, co-insurance, and/or copay. Some of these benefits include a comprehensive review of your medical history including lifestyle, illnesses that may run in your family, and various assessments and screenings as appropriate. After reviewing your medical record and screening and assessments performed today your provider may have ordered immunizations, labs, imaging, and/or referrals for you. A list of these orders (if applicable) as well as your Preventive Care list are included within your After Visit Summary for your review. Other Preventive Recommendations:    · A preventive eye exam performed by an eye specialist is recommended every 1-2 years to screen for glaucoma; cataracts, macular degeneration, and other eye disorders. · A preventive dental visit is recommended every 6 months. · Try to get at least 150 minutes of exercise per week or 10,000 steps per day on a pedometer . · Order or download the FREE \"Exercise & Physical Activity: Your Everyday Guide\" from [employer]. Call 6-419.214.1856 or search [employer] online. · You need 9456-5753 mg of calcium and 0371-0771 IU of vitamin D per day. It is possible to meet your calcium requirement with diet alone, but a vitamin D supplement is usually necessary to meet this goal.  · When exposed to the sun, use a sunscreen that protects against both UVA and UVB radiation with an SPF of 30 or greater. Reapply every 2 to 3 hours or after sweating, drying off with a towel, or swimming. · Always wear a seat belt when traveling in a car. Always wear a helmet when riding a bicycle or motorcycle. Physical Therapy Evaluation    Patient Name:  Jair Pantoja   MRN:  [id number]    Recommendations:     Discharge Recommendations: Low Intensity Therapy (home with home health physical therapy)   Discharge Equipment Recommendations: none   Barriers to discharge:  current medical status    Assessment:     Jair Pantoja is a 83 y.o. male admitted with a medical diagnosis of Generalized weakness.  He presents with the following impairments/functional limitations: weakness, gait instability, impaired endurance     Physical therapy evaluation complete. Patient tolerated B LE strength testing in seated position. He was able to complete supine <> sit transfer, mod A. Sit <> stand transfer completed, min A with RW. Gait assessment complete 2x30 feet with RW, SBA/CGA.    Rehab Prognosis: Good and Fair; patient would benefit from acute skilled PT services to address these deficits and reach maximum level of function.    Recent Surgery: * No surgery found *      Plan:     During this hospitalization, patient to be seen 5 x/week (5-6x/week, 1-2x/day) to address the identified rehab impairments via gait training, therapeutic activities, therapeutic exercises and progress toward the following goals:    Plan of Care Expires:  11/30/23    Subjective     Chief Complaint: weakness  Patient/Family Comments/goals: to return home  Pain/Comfort:  Pain Rating 1: 7/10  Location - Orientation 1: posterior  Location 1: neck  Pain Addressed 1: Reposition    Patients cultural, spiritual, Jainism conflicts given the current situation:      Living Environment:  Home with daughter.   Prior to admission, patients level of function was independent.  Equipment used at home: nebulizer, cane, straight.  DME owned (not currently used): single point cane.  Upon discharge, patient will have assistance from family.    Objective:     Communicated with nursing prior to session.  Patient found HOB elevated with peripheral IV  upon PT entry to  room.    General Precautions: Standard, contact  Orthopedic Precautions:N/A   Braces: N/A  Respiratory Status: Room air    Exams:  RUE Strength: WFL  LUE Strength: WFL  RLE Strength: grossly, 4-/5  LLE Strength: grossly, 4-/5    Functional Mobility:  Bed Mobility:     Supine to Sit: moderate assistance  Sit to Supine: moderate assistance  Transfers:     Sit to Stand:  stand by assistance with rolling walker  Gait: 2x30 feet with RW  Balance: required SBA/CGA      AM-PAC 6 CLICK MOBILITY  Total Score:18       Treatment & Education:  See above. Call light education and fall prevention    Patient left HOB elevated with all lines intact, call button in reach, bed alarm off, and family present.    GOALS:   Multidisciplinary Problems       Physical Therapy Goals          Problem: Physical Therapy    Goal Priority Disciplines Outcome Goal Variances Interventions   Physical Therapy Goal     PT, PT/OT Ongoing, Progressing     Description: Goals to be met by: discharge     Patient will increase functional independence with mobility by performin. Supine to sit with Modified [place]  2. Sit to supine with Modified [place]  3. Sit to stand transfer with Supervision and Stand-by Assistance  4. Gait  x 100 feet with Supervision and Stand-by Assistance using Rolling Walker.                          History:     Past Medical History:   Diagnosis Date    Diabetes     Heart disease     MI (myocardial infarction)     Pacemaker     Parkinson's disease     Paroxysmal atrial fibrillation     Stroke        Past Surgical History:   Procedure Laterality Date    APPENDECTOMY      CARDIAC SURGERY      CHOLECYSTECTOMY      pace maker      PROSTATE SURGERY         Time Tracking:     PT Received On: 10/31/23  PT Start Time: 930     PT Stop Time: 946  PT Total Time (min): 16 min     Billable Minutes: Evaluation 16      10/31/2023

## 2023-10-31 NOTE — DISCHARGE SUMMARY
Hospital Medicine  Discharge Summary    Patient Name: Jair Pantoja  MRN: [id number]  Admit Date: 10/30/2023  Discharge Date:  10/31/2023  Status: OP- Observation   Length of Stay: 0      PHYSICIANS   Admitting Physician: Qi Willson MD  Discharging Physician: Santo Dunlap MD.  Primary Care Physician: Angel Evans MD        DISCHARGE DIAGNOSES     Generalized Weakness:  Due to GI losses (diarrhea from 1 week ago)  In ability to perform activities of daily living  CT head negative for acute pathology; noted chronic lacunar infarcts  Fluid resuscitation  PT consulted to eval and treat  SW to assist with discharge planning     Paroxysmal Atrial Fibrillation:  EKG: NO STEMI. A. fib  Rate currently controlled  Resume home meds once reconciled      H/O CVA:  BP control     Diabetes Mellitus:  Currently controlled  Monitoring  Hypoglycemic protocol in place  Review home meds once available    PROCEDURES         HOSPITAL COURSE    Improved with IVFs and PT.  No longer want Rehab.   Will d/c home with home health and PT.      STATUS  Improved Stable    DISPOSITION  Discharge to home with home health    DIET      ACTIVITY  As tolerated      FOLLOW-UP         DISCHARGE MEDICATION RECONCILIATION        Medication List        CONTINUE taking these medications      albuterol-ipratropium 2.5 mg-0.5 mg/3 mL nebulizer solution  Commonly known as: DUO-NEB     alfuzosin 10 mg Tb24  Commonly known as: UROXATRAL  TAKE 1 TABLET BY MOUTH EVERY DAY     digoxin 125 mcg tablet  Commonly known as: LANOXIN     ELIQUIS 5 mg Tab  Generic drug: apixaban                PHYSICAL EXAM   VITALS: T 97.5 °F (36.4 °C)   BP (!) 144/93   P 62   RR 16   O2 97 %    Physical Exam  Vitals reviewed.   HENT:      Head: Normocephalic.   Cardiovascular:      Rate and Rhythm: Normal rate.   Pulmonary:      Effort: Pulmonary effort is normal.   Neurological:      Mental Status: He is alert.              Discharge time: 33 minutes     Santo GARCIA  "MD Germ[facility]  [facility]       DIAGNOSITCS   CBC:   Recent Labs   Lab 10/30/23  1603 10/31/23  0420   WBC 6.92 4.65   HGB 14.3 12.5*   HCT 41.9 36.6    158     COAG:  No results for input(s): "APTT", "INR", "PTT" in the last 168 hours.  CMP:   Recent Labs   Lab 10/30/23  1603 10/31/23  0420   CALCIUM 8.9 8.5   ALBUMIN 4.2  --     135   K 4.0 3.7   CO2 29 28   BUN 22.0* 22.0*   CREATININE 0.67 0.57*   ALKPHOS 54  --    ALT 22  --    AST 46  --    BILITOT 1.1*  --    MG 2.40  --      Estimated Creatinine Clearance: 82.2 mL/min (A) (based on SCr of 0.57 mg/dL (L)).  CARDIAC ENZYMES:   Recent Labs     10/30/23  1603   CPK 52*        No results for input(s): "AMYLASE", "LIPASE" in the last 168 hours.      No results for input(s): "POCTGLUCOSE" in the last 72 hours.     Microbiology Results (last 7 days)       ** No results found for the last 168 hours. **               X-Ray Chest AP Portable    Result Date: 10/31/2023  EXAMINATION: XR CHEST AP PORTABLE CLINICAL HISTORY: Cough, unspecified TECHNIQUE: Single frontal view of the chest was performed. COMPARISON: 03/23/2022 FINDINGS: Single lead cardiac device identified.  The cardiomediastinal silhouette is normal.  There is a tube or possible device projected over the right lateral chest wall, possible some chest wall soft tissue gas in the region.  Lungs otherwise clear.  No infiltrate.  No definite pneumothorax seen.     Suspected tube or partially imaged device projected over the right lateral chest wall.  Region is incompletely imaged. Electronically signed by: Ha Long MD Date:    10/31/2023 Time:    08:17    CT Head Without Contrast    Result Date: 10/30/2023  CT HEAD WITHOUT CONTRAST: CPT 33870 Total DLP: 1211.90 mGy-cm Automatic exposure control was utilized to limit the radiation dose to the patient. History: Worsening weakness and loss of function and inability to perform activities of daily living. Comparison: MRI 06/02/2015 " Technique: Multiple contiguous axial images were acquired from the base of the skull the vertex without contrast administration. Findings: There are diffuse cerebral and cerebellar parenchymal involutional changes with resultant prominence of the ventricles and basal cisterns.  There is a chronic lacunar infarct versus prominent perivascular spaces in both external capsules, lenticular nuclei, and thalami there is extensive asymmetric low density without obvious mass-effect throughout the bilateral supratentorial white matter. The gray-white junctions are maintained. No other cerebral or cerebellar parenchymal abnormality is identified. There is no hemorrhage, midline shift, significant mass-effect, or extra-axial fluid collection. There are calcifications of the distal internal carotid and vertebrobasilar arteries. The partially visualized paranasal sinuses and mastoid air cells are clear. The calvarium is intact.     Impression: 1. No acute intracranial process is identified. 2. Scattered white matter microvascular ischemic changes. 3. Diffuse involutional changes are proportionate to the patient's age. 4.  Chronic lacunar infarcts versus prominent perivascular spaces in the bilateral deep gray and white matter structures, as above. Electronically signed by: Yoan Price MD Date:    10/30/2023 Time:    18:14

## 2023-10-31 NOTE — PLAN OF CARE
Problem: Physical Therapy  Goal: Physical Therapy Goal  Description: Goals to be met by: discharge     Patient will increase functional independence with mobility by performin. Supine to sit with Modified [place]  2. Sit to supine with Modified [place]  3. Sit to stand transfer with Supervision and Stand-by Assistance  4. Gait  x 100 feet with Supervision and Stand-by Assistance using Rolling Walker.     Outcome: Ongoing, Progressing

## 2023-10-31 NOTE — H&P
"[facility]    History & Physical      Patient Name: Jair Pantoja  MRN: [id number]  Admission Date: 10/30/2023  Attending Physician: Luz Willson MD  Primary Care Provider: Angel Evans MD         Patient information was obtained from patient and ER records.     Subjective:     Principal Problem:Generalized weakness    Chief Complaint:   Chief Complaint   Patient presents with    Weakness     Pt reports increase in weakness since yesterday and High BP readings. Pt denies any chest pain. Pt states "I just feel week." Pt denies N/V. States having diarrhea x couple of days ago. Pt daughter reports pt usually up and around independently, w/ occasional ambulation w/ cane. Pt does have significant cardiac hx and is on digoxin. Dr. Marte Cardiologist and Nathan PCP    Hypertension        HPI: 83 year old male with pmh DM II, HTN, A. Fib, CVA presented to ED accompanied by his daughter due to concerns for decreased activities with generalized weakness of one day duration. He reported diarrhea about 1 week prior. He denies fever or chills.     Past Medical History:   Diagnosis Date    Diabetes     Heart disease     MI (myocardial infarction)     Stroke        Past Surgical History:   Procedure Laterality Date    APPENDECTOMY      CARDIAC SURGERY      CHOLECYSTECTOMY      pace maker      PROSTATE SURGERY         Review of patient's allergies indicates:   Allergen Reactions    Flomax [tamsulosin] Other (See Comments)    Opioids - morphine analogues Hallucinations       No current facility-administered medications on file prior to encounter.     Current Outpatient Medications on File Prior to Encounter   Medication Sig    alfuzosin (UROXATRAL) 10 mg Tb24 TAKE 1 TABLET BY MOUTH EVERY DAY     Family History    None       Tobacco Use    Smoking status: Never    Smokeless tobacco: Not on file   Substance and Sexual Activity    Alcohol use: Not Currently    Drug use: Not on file    Sexual " activity: Not on file     Review of Systems   Constitutional:  Positive for fatigue.   HENT: Negative.     Eyes: Negative.    Respiratory: Negative.     Cardiovascular: Negative.    Gastrointestinal: Negative.    Endocrine: Negative.    Genitourinary: Negative.    Musculoskeletal: Negative.    Neurological:  Positive for weakness.   Hematological: Negative.    Psychiatric/Behavioral: Negative.       Objective:     Vital Signs (Most Recent):  Temp: 97.7 °F (36.5 °C) (10/30/23 1542)  Pulse: 69 (10/30/23 2022)  Resp: 19 (10/30/23 2022)  BP: (!) 170/104 (10/30/23 2022)  SpO2: 98 % (10/30/23 2022) Vital Signs (24h Range):  Temp:  [97.7 °F (36.5 °C)] 97.7 °F (36.5 °C)  Pulse:  [63-82] 69  Resp:  [4-22] 19  SpO2:  [96 %-100 %] 98 %  BP: (125-170)/() 170/104     Weight: 61.2 kg (135 lb)  Body mass index is 20.53 kg/m².    Physical Exam  Constitutional:       Appearance: Normal appearance.   HENT:      Head: Normocephalic and atraumatic.      Mouth/Throat:      Mouth: Mucous membranes are dry.   Eyes:      Extraocular Movements: Extraocular movements intact.      Pupils: Pupils are equal, round, and reactive to light.   Cardiovascular:      Rate and Rhythm: Normal rate. Rhythm irregular.      Pulses: Normal pulses.      Heart sounds: Normal heart sounds.   Pulmonary:      Breath sounds: Normal breath sounds.   Abdominal:      Palpations: Abdomen is soft.   Musculoskeletal:         General: Normal range of motion.      Cervical back: Neck supple.   Skin:     General: Skin is warm and dry.      Capillary Refill: Capillary refill takes less than 2 seconds.   Neurological:      General: No focal deficit present.      Mental Status: He is alert. Mental status is at baseline.      Motor: Weakness present.   Psychiatric:         Behavior: Behavior normal.           CRANIAL NERVES     CN III, IV, VI   Pupils are equal, round, and reactive to light.      Significant Labs: All pertinent labs within the past 24 hours have been  reviewed.  Recent Lab Results         10/30/23  1639   10/30/23  1603        Albumin/Globulin Ratio   1.7       Albumin   4.2       ALP   54       ALT   22       Anion Gap   6.0       Appearance, UA SL CLOUDY         AST   46       Baso #   0.05       Basophil %   0.7       BILIRUBIN TOTAL   1.1       Bilirubin, UA Negative         BUN   22.0       BUN/CREAT RATIO   33       Calcium   8.9       Chloride   101       CO2   29       Color, UA Yellow         CPK   52       Creatinine   0.67       eGFR   >90  Comment:                      EGFR INTERPRETATION    Beginning 8/15/22 we are reporting the eGFRcr calculation as recommended by the National Kid[employer]. The eGFRcr equation has similar overall performance characteristics to the older equation, but the values may differ by more than 10% particularly at higher values of eGFRcr and younger adult ages.    NKF stages of chronic kidney disease (CKD)  Stage 1: Kidney damage with normal or increased eGFR (>90 mL/min/1.73 m^2)  Stage 2: Mild reduction in GFR (60-89 mL/min/1.73 m^2)  Stage 3a: Moderate reduction in GFR (45-59 mL/min/1.73 m^2)  Stage 3b: Moderate reduction in GFR (30-44 mL/min/1.73 m^2)  Stage 4: Severe reduction in GFR (15-29 mL/min/1.73 m^2)  Stage 5: Kidney failure (GFR <15 mL/min/1.73 m^2)           Eos #   0.03       Eosinophil %   0.4       Globulin, Total   2.5       Glucose   79       Glucose, UA Negative         Hematocrit   41.9       Hemoglobin   14.3       Immature Grans (Abs)   0.02       Immature Granulocytes   0.3       Ketones, UA 15         Leukocytes, UA Negative         Lymph #   0.87       LYMPH %   12.6       Magnesium    2.40       MCH   31.9       MCHC   34.1       MCV   93.5       Mono #   0.50       Mono %   7.2       MPV   9.6       Neut #   5.45       Neut %   78.8       NITRITE UA Negative         nRBC   0.0       Occult Blood UA Negative         pH, UA 7.0         Platelet Count   172       Potassium   4.0       PROTEIN  TOTAL   6.7       Protein, UA 30         RBC   4.48       RDW   14.3       Sodium   136       Specific Gravity,UA 1.020         Urobilinogen, UA 0.2         WBC   6.92               Significant Imaging: I have reviewed all pertinent imaging results/findings within the past 24 hours.    Assessment/Plan:     Active Diagnoses:    Diagnosis Date Noted POA    PRINCIPAL PROBLEM:  Generalized weakness [R53.1] 10/30/2023 Yes    Paroxysmal atrial fibrillation [I48.0] 10/30/2023 Yes    History of CVA (cerebrovascular accident) [Z86.73] 10/30/2023 Not Applicable      Problems Resolved During this Admission:    Diagnosis Date Noted Date Resolved POA    CVA (cerebral vascular accident) [I63.9] 10/30/2023 10/30/2023 No     VTE Risk Mitigation (From admission, onward)           Ordered     enoxaparin injection 40 mg  Daily         10/30/23 2019                  Generalized Weakness:  Due to GI losses (diarrhea from 1 week ago)  In ability to perform activities of daily living  CT head negative for acute pathology; noted chronic lacunar infarcts  Fluid resuscitation  PT consulted to eval and treat  SW to assist with discharge planning    Paroxysmal Atrial Fibrillation:  EKG: NO STEMI. A. fib  Rate currently controlled  Resume home meds once reconciled     H/O CVA:  BP control    Diabetes Mellitus:  Currently controlled  Monitoring  Hypoglycemic protocol in place  Review home meds once available       Code: FULL   PPX: BSCDs       The patient is expected to have a LOS less than 2 midnights and will be admitted to OBSERVATION status.       Service was provided using HIPAA compliant web platform using SOC for audio/visual equipment.  Patient location: Hospital  Provider Location: [place], [place]  Participants on call: Bedside RN, Patient  Consent was obtained and the patient was seen with nurse assiting from the bedside.      Luz Willson MD  Department of Hospital Medicine   [facility]